# Patient Record
Sex: MALE | Race: BLACK OR AFRICAN AMERICAN | NOT HISPANIC OR LATINO | Employment: UNEMPLOYED | URBAN - METROPOLITAN AREA
[De-identification: names, ages, dates, MRNs, and addresses within clinical notes are randomized per-mention and may not be internally consistent; named-entity substitution may affect disease eponyms.]

---

## 2019-02-28 ENCOUNTER — APPOINTMENT (EMERGENCY)
Dept: RADIOLOGY | Facility: HOSPITAL | Age: 44
End: 2019-02-28
Payer: COMMERCIAL

## 2019-02-28 ENCOUNTER — HOSPITAL ENCOUNTER (EMERGENCY)
Facility: HOSPITAL | Age: 44
Discharge: HOME | End: 2019-03-01
Attending: EMERGENCY MEDICINE | Admitting: EMERGENCY MEDICINE
Payer: COMMERCIAL

## 2019-02-28 VITALS
RESPIRATION RATE: 16 BRPM | OXYGEN SATURATION: 100 % | HEIGHT: 68 IN | DIASTOLIC BLOOD PRESSURE: 98 MMHG | WEIGHT: 149.38 LBS | HEART RATE: 56 BPM | BODY MASS INDEX: 22.64 KG/M2 | SYSTOLIC BLOOD PRESSURE: 146 MMHG | TEMPERATURE: 97.8 F

## 2019-02-28 DIAGNOSIS — R68.89 FLU-LIKE SYMPTOMS: ICD-10-CM

## 2019-02-28 DIAGNOSIS — J40 BRONCHITIS: Primary | ICD-10-CM

## 2019-02-28 LAB
FLUAV AG SPEC QL IA: NEGATIVE
FLUBV AG SPEC QL IA: NEGATIVE

## 2019-02-28 PROCEDURE — 87631 RESP VIRUS 3-5 TARGETS: CPT | Performed by: PHYSICIAN ASSISTANT

## 2019-02-28 PROCEDURE — 99284 EMERGENCY DEPT VISIT MOD MDM: CPT

## 2019-02-28 PROCEDURE — 71046 X-RAY EXAM CHEST 2 VIEWS: CPT

## 2019-02-28 RX ORDER — ONDANSETRON 4 MG/1
4 TABLET, ORALLY DISINTEGRATING ORAL ONCE
Status: COMPLETED | OUTPATIENT
Start: 2019-02-28 | End: 2019-02-28

## 2019-02-28 RX ORDER — ACETAMINOPHEN 325 MG/1
650 TABLET ORAL ONCE
Status: COMPLETED | OUTPATIENT
Start: 2019-02-28 | End: 2019-02-28

## 2019-02-28 RX ORDER — IBUPROFEN 800 MG/1
800 TABLET ORAL EVERY 8 HOURS PRN
Qty: 15 TABLET | Refills: 0 | Status: SHIPPED | OUTPATIENT
Start: 2019-02-28 | End: 2019-03-05

## 2019-02-28 RX ORDER — IBUPROFEN 400 MG/1
800 TABLET ORAL EVERY 6 HOURS PRN
Status: DISCONTINUED | OUTPATIENT
Start: 2019-02-28 | End: 2019-03-01 | Stop reason: HOSPADM

## 2019-02-28 RX ADMIN — ONDANSETRON 4 MG: 4 TABLET, ORALLY DISINTEGRATING ORAL at 21:56

## 2019-02-28 RX ADMIN — ACETAMINOPHEN 650 MG: 325 TABLET, FILM COATED ORAL at 21:56

## 2019-02-28 RX ADMIN — IBUPROFEN 800 MG: 400 TABLET ORAL at 21:56

## 2019-03-01 LAB
FLUAV AG SPEC QL: NOT DETECTED
FLUBV AG SPEC QL: NOT DETECTED
RSV B RNA SPEC QL NAA+PROBE: NOT DETECTED

## 2019-03-01 NOTE — ED NOTES
Pt states he took a pill this morning at 1000 given to him by a friend but he cannot name what symptom pill was supposed to cure and cannot name medication  Initially, pt states pill was for fever, then he stated it was to calm him down, then he stated it was to clear his head         Shawn Polanco RN  02/28/19 2103       Shawn Polanco RN  02/28/19 2118

## 2019-03-01 NOTE — ED NOTES
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

## 2019-03-06 NOTE — ED PROVIDER NOTES
History  Chief Complaint   Patient presents with    Flu Symptoms     pt c/o "flu symptoms"  Pt reports on/off warmth and chills but did not take temp   +cough with green phlegm  51-year-old male with no known past history, presents for evaluation of generalized body aches, productive cough with green sputum, chest tightness and chills for the past day  Subjective fevers  Patient reports that he was given medication earlier today however is unsure of the name  States that it did not provide much relief  Patient reports that he has around some sick contacts at work  He denies being a smoker  Flu Symptoms   Presenting symptoms: cough, fever and myalgias    Presenting symptoms: no diarrhea, no nausea, no rhinorrhea, no shortness of breath, no sore throat and no vomiting    Associated symptoms: chills    Associated symptoms: no congestion and no neck stiffness        None       History reviewed  No pertinent past medical history  Past Surgical History:   Procedure Laterality Date    BACK SURGERY         History reviewed  No pertinent family history  I have reviewed and agree with the history as documented  Social History     Tobacco Use    Smoking status: Never Smoker    Smokeless tobacco: Never Used   Substance Use Topics    Alcohol use: Yes     Comment: occasionally  last drank last night    Drug use: Yes     Types: Marijuana     Comment: last smoked 1 week ago        Review of Systems   Constitutional: Positive for chills and fever  HENT: Negative for congestion, rhinorrhea and sore throat  Respiratory: Positive for cough  Negative for chest tightness, shortness of breath and wheezing  Gastrointestinal: Negative for abdominal pain, constipation, diarrhea, nausea and vomiting  Genitourinary: Negative for dysuria, hematuria and urgency  Musculoskeletal: Positive for myalgias  Negative for arthralgias, back pain, neck pain and neck stiffness     Skin: Negative for color change and wound  Neurological: Positive for weakness  Negative for numbness  Physical Exam  Physical Exam   Constitutional: He is oriented to person, place, and time  He appears well-developed and well-nourished  No distress  HENT:   Head: Normocephalic and atraumatic  Right Ear: External ear normal    Left Ear: External ear normal    Mouth/Throat: Oropharynx is clear and moist  No oropharyngeal exudate  Eyes: Conjunctivae are normal    Cardiovascular: Normal rate and normal heart sounds  Pulmonary/Chest: Effort normal  No respiratory distress  He has no rhonchi  He has no rales  He exhibits no tenderness  Musculoskeletal: Normal range of motion  Neurological: He is alert and oriented to person, place, and time  Skin: Skin is warm  He is not diaphoretic  No erythema  Nursing note and vitals reviewed        Vital Signs  ED Triage Vitals [02/28/19 2054]   Temperature Pulse Respirations Blood Pressure SpO2   97 8 °F (36 6 °C) 56 16 146/98 100 %      Temp Source Heart Rate Source Patient Position - Orthostatic VS BP Location FiO2 (%)   Oral Monitor Sitting Right arm --      Pain Score       Worst Possible Pain           Vitals:    02/28/19 2054   BP: 146/98   Pulse: 56   Patient Position - Orthostatic VS: Sitting       Visual Acuity      ED Medications  Medications   acetaminophen (TYLENOL) tablet 650 mg (650 mg Oral Given 2/28/19 2156)   ondansetron (ZOFRAN-ODT) dispersible tablet 4 mg (4 mg Oral Given 2/28/19 2156)       Diagnostic Studies  Results Reviewed     Procedure Component Value Units Date/Time    INFLUENZA A/B AND RSV, PCR [[id number]]  (Normal) Collected:  02/28/19 2158    Lab Status:  Final result Specimen:  Nasopharyngeal Swab Updated:  03/01/19 1056     INFLU A PCR Not Detected     INFLU B PCR Not Detected     RSV PCR Not Detected    Rapid Influenza Screen with Reflex PCR [[id number]]  (Normal) Collected:  02/28/19 2158    Lab Status:  Final result Specimen:  Nasopharyngeal Swab Updated: 02/28/19 2254     Rapid Influenza A Ag Negative     Rapid Influenza B Ag Negative                 XR chest 2 views   ED Interpretation by Stella Romero PA-C (02/28 2210)   Interpreted by me  No infiltrate, nl cardiac silhouette, no effusions       Final Result by Lissa Lopes MD (03/01 9865)      No acute cardiopulmonary disease  Workstation performed: [id number]                    Procedures  Procedures       Phone Contacts  ED Phone Contact    ED Course  ED Course as of Mar 06 0645   u Feb 28, 2019   2312 Reports improvement in symptoms s/p ibuprofen  MDM    Disposition  Final diagnoses:   Bronchitis   Flu-like symptoms     Time reflects when diagnosis was documented in both MDM as applicable and the Disposition within this note     Time User Action Codes Description Comment    2/28/2019 11:12 PM Lucretia Main Add [J40] Bronchitis     2/28/2019 11:12 PM Lucretia Main Add [R68 89] Flu-like symptoms       ED Disposition     ED Disposition Condition Date/Time Comment    Discharge Stable Thu Feb 28, 2019 11:12 PM Macorel Nivol discharge to home/self care  Follow-up Information     Follow up With Specialties Details Why [address] [facility] [address] Family Medicine Schedule an appointment as soon as possible for a visit in 1 week Follow up for re-check of symptoms, As needed [address] [place] [address] Drive [address] [phone]            Discharge Medication List as of 2/28/2019 11:14 PM      START taking these medications    Details   ibuprofen (MOTRIN) 800 mg tablet Take 1 tablet (800 mg total) by mouth every 8 (eight) hours as needed for moderate pain for up to 5 days, Starting Thu 2/28/2019, Until Tue 3/5/2019, Print           No discharge procedures on file      ED Provider  Electronically Signed by           Stella Romero PA-C  03/06/19 7197

## 2020-06-05 ENCOUNTER — EMERGENCY (EMERGENCY)
Facility: HOSPITAL | Age: 45
LOS: 0 days | Discharge: HOME | End: 2020-06-05
Payer: SELF-PAY

## 2020-06-05 VITALS
SYSTOLIC BLOOD PRESSURE: 119 MMHG | HEART RATE: 58 BPM | OXYGEN SATURATION: 100 % | DIASTOLIC BLOOD PRESSURE: 77 MMHG | RESPIRATION RATE: 20 BRPM | WEIGHT: 149.91 LBS | TEMPERATURE: 98 F | HEIGHT: 68 IN

## 2020-06-05 DIAGNOSIS — S61.219A LACERATION WITHOUT FOREIGN BODY OF UNSPECIFIED FINGER WITHOUT DAMAGE TO NAIL, INITIAL ENCOUNTER: ICD-10-CM
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
## 2020-06-05 DIAGNOSIS — S61.214A LACERATION WITHOUT FOREIGN BODY OF RIGHT RING FINGER WITHOUT DAMAGE TO NAIL, INITIAL ENCOUNTER: ICD-10-CM

## 2020-06-05 PROCEDURE — 73140 X-RAY EXAM OF FINGER(S): CPT | Mod: 26,RT

## 2020-06-05 PROCEDURE — 12002 RPR S/N/AX/GEN/TRNK2.6-7.5CM: CPT

## 2020-06-05 PROCEDURE — 99283 EMERGENCY DEPT VISIT LOW MDM: CPT | Mod: 25

## 2020-06-05 NOTE — ED PROCEDURE NOTE - CPROC ED POST PROC CARE GUIDE1
Verbal/written post procedure instructions were given to patient/caregiver./Instructed patient/caregiver to follow-up with primary care physician./Keep the cast/splint/dressing clean and dry./Instructed patient/caregiver regarding signs and symptoms of infection./Elevate the injured extremity as instructed.
Verbal/written post procedure instructions were given to patient/caregiver./Instructed patient/caregiver to follow-up with primary care physician./Keep the cast/splint/dressing clean and dry./Instructed patient/caregiver regarding signs and symptoms of infection.

## 2020-06-05 NOTE — ED PROVIDER NOTE - PATIENT PORTAL LINK FT
You can access the FollowMyHealth Patient Portal offered by [facility] by registering at the following website: [email or web address]. By joining [facility]’s FollowMyHealth portal, you will also be able to view your health information using other applications (apps) compatible with our system.

## 2020-06-05 NOTE — ED ADULT NURSE NOTE - OBJECTIVE STATEMENT
pt received c/o laceration to the pt received c/o laceration to the 4th right finger, bleeding approx  2inches"

## 2020-06-05 NOTE — ED PROVIDER NOTE - NSFOLLOWUPINSTRUCTIONS_ED_ALL_ED_FT

## 2020-06-05 NOTE — ED PROVIDER NOTE - MUSCULOSKELETAL MINIMAL EXAM
laceration to right 4th finger, FROM of digits against resistance at MCP, PIP and DIP. Wound explored, no tendon laceration. no vascular compromise.

## 2020-06-05 NOTE — ED PROVIDER NOTE - CARE PROVIDER_API CALL
Brandon Montoya  PLASTIC SURGERY  [address] [place] [address] [id number]  Phone: [phone]  Fax: [phone]  Follow Up Time:

## 2020-06-05 NOTE — ED PROVIDER NOTE - CLINICAL SUMMARY MEDICAL DECISION MAKING FREE TEXT BOX
45 yo male with no significant pmh presents to the ED c/o laceration to palmar aspect of right 4th finger occurred after fall while holding a clam juice drink which broke and cut his finger, occurred prior to arrival. UTD with tetanus. No additional injury. Denies fever, chills, chest pain, sob, head trauma, headache, dizziness, UE/LE weakness or paresthesias. A/P: + 4 cm laceration to palmar aspect of right 4th finger, wound explored, no FB, no tendon laceration, + small vascular bleed at site of laceration, vessel tied off with figure 8 suture. finger xr - no fx or FB. Wound cleansed and repaired. Finger placed in splint, abx sent to pharmacy. Will follow up with hand surgery. Patient understands return precautions. Suture removal in 10-12 days.

## 2020-06-05 NOTE — ED PROVIDER NOTE - OBJECTIVE STATEMENT
43 yo male with no significant pmh presents to the ED c/o laceration to palmar aspect of right 4th finger occurred after fall while holding a clam juice drink which broke and cut his finger, occurred prior to arrival. UTD with tetanus. No additional injury. Denies fever, chills, chest pain, sob, head trauma, headache, dizziness, UE/LE weakness or paresthesias

## 2020-06-16 ENCOUNTER — EMERGENCY (EMERGENCY)
Facility: HOSPITAL | Age: 45
LOS: 0 days | Discharge: HOME | End: 2020-06-16
Payer: SELF-PAY

## 2020-06-16 VITALS
SYSTOLIC BLOOD PRESSURE: 109 MMHG | OXYGEN SATURATION: 100 % | RESPIRATION RATE: 16 BRPM | DIASTOLIC BLOOD PRESSURE: 77 MMHG | HEART RATE: 89 BPM | WEIGHT: 145.06 LBS | TEMPERATURE: 99 F | HEIGHT: 68 IN

## 2020-06-16 PROCEDURE — 99283 EMERGENCY DEPT VISIT LOW MDM: CPT

## 2020-06-16 RX ADMIN — Medication 1 TABLET(S): at 13:04

## 2020-06-16 NOTE — ED PROVIDER NOTE - SKIN, MLM
Skin normal color for race, warm, dry and intact. No evidence of rash. + RIGHT FInf 4th - 12 suture, prom, good pusle and sensory, no erythema, mild tenderness

## 2020-06-16 NOTE — ED PROVIDER NOTE - CLINICAL SUMMARY MEDICAL DECISION MAKING FREE TEXT BOX
continue abx, daily wound care and f/u w hand plastic in 1 week   Discussed results and outcome of testing with the patient.  Patient advised to please follow up with their primary care doctor within the next 24-48 hours and return to the Emergency Department for worsening symptoms or any other concerns.  Patient advised that their doctor may call  to follow up on the specific results of the tests performed today in the emergency department.

## 2020-06-16 NOTE — ED PROVIDER NOTE - OBJECTIVE STATEMENT
This is a 43 yo m comes to Ed for suture removal of right ring suture removal 6/4/2020. Denies n/v/f/d/. Pt not taken antobodics and preforming wound care

## 2020-06-16 NOTE — ED PROVIDER NOTE - PATIENT PORTAL LINK FT
You can access the FollowMyHealth Patient Portal offered by [facility] by registering at the following website: [email or web address]. By joining [facility]’s FollowMyHealth portal, you will also be able to view your health information using other applications (apps) compatible with our system.

## 2020-06-16 NOTE — ED ADULT NURSE NOTE - OBJECTIVE STATEMENT
Pt is a 44YOM who is here for removal of sutures in his right 4th finger, pt states he has been non-compliant with his medication for antibiotic use, pt is able to use his finger and hand with minimal pain, but there is swelling and some pain. Pt denies fever, denies chills, denies nausea and vomiting.

## 2020-06-16 NOTE — ED ADULT NURSE NOTE - PMH
Laceration of right ring finger without foreign body, nail damage status unspecified, subsequent encounter

## 2020-06-17 DIAGNOSIS — Z48.02 ENCOUNTER FOR REMOVAL OF SUTURES: ICD-10-CM

## 2023-11-28 NOTE — ED ADULT NURSE NOTE - CAS TRG GENERAL NORM CIRC DET
Strong peripheral pulses balance training/bed mobility training/gait training/strengthening/stretching/transfer training